# Patient Record
Sex: MALE | Race: WHITE
[De-identification: names, ages, dates, MRNs, and addresses within clinical notes are randomized per-mention and may not be internally consistent; named-entity substitution may affect disease eponyms.]

---

## 2018-07-20 ENCOUNTER — HOSPITAL ENCOUNTER (EMERGENCY)
Dept: HOSPITAL 76 - ED | Age: 73
Discharge: HOME | End: 2018-07-20
Payer: MEDICARE

## 2018-07-20 VITALS — SYSTOLIC BLOOD PRESSURE: 131 MMHG | DIASTOLIC BLOOD PRESSURE: 91 MMHG
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
## 2018-07-20 DIAGNOSIS — I48.91: ICD-10-CM
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
## 2018-07-20 DIAGNOSIS — Z79.01: ICD-10-CM

## 2018-07-20 DIAGNOSIS — Z79.82: ICD-10-CM

## 2018-07-20 DIAGNOSIS — S81.812A: Primary | ICD-10-CM

## 2018-07-20 PROCEDURE — 99283 EMERGENCY DEPT VISIT LOW MDM: CPT

## 2018-07-20 PROCEDURE — 12002 RPR S/N/AX/GEN/TRNK2.6-7.5CM: CPT

## 2018-07-20 PROCEDURE — 85610 PROTHROMBIN TIME: CPT

## 2018-07-20 PROCEDURE — 99282 EMERGENCY DEPT VISIT SF MDM: CPT

## 2018-07-20 NOTE — ED PHYSICIAN DOCUMENTATION
PD HPI LOWER EXT INJURY





- Stated complaint


Stated Complaint: LT LEG LAC





- Chief complaint


Chief Complaint: Laceration





- History obtained from


History obtained from: Patient





- History of Present Illness


PD HPI LOW EXT INJURY LOCATION: Left, Lower leg


Type of injury: Blunt / blow


Where injury occurred: Street


Timing - onset: Today


Timing - duration: Minutes


Timing - details: Abrupt onset, Still present


Improved by: Rest, Immobilization


Worsened by: Moving, Palpating


Associated symptoms: No: Weakness, Numbness, Tingling


Contributing factors: Anticoagulated


Similar symptoms before: Diagnosis (skin tear)


Recently seen: Not recently seen





- Additional information


Additional information: 





73-year-old male on Coumadin has hit the side of his left calf with his door 

and split open the skin.  He is come in now for suturing.  He is visiting here 

from [place] and he will be in the area until September.





Review of Systems


Constitutional: denies: Fever


Eyes: denies: Decreased vision


Ears: denies: Ear pain


Nose: denies: Congestion


Throat: denies: Sore throat


Respiratory: denies: Cough


GI: denies: Vomiting


Skin: reports: Laceration (s)


Neurologic: denies: Generalized weakness, Focal weakness, Numbness





PD PAST MEDICAL HISTORY





- Past Medical History


Past Medical History: Yes


Cardiovascular: Atrial fibrillation


Respiratory: Sleep apnea


: Other


Other Past Medical History: Prostate cancer.





- Past Surgical History


Past Surgical History: Yes


Cardiovascular: Other





- Present Medications


Home Medications: 


 Ambulatory Orders











 Medication  Instructions  Recorded  Confirmed


 


Albuterol 2.5 mg INH Q4H PRN 07/20/18 07/20/18


 


Aspirin 81 mg PO 07/20/18 


 


Atorvastatin [Lipitor] 10 mg ORAL DAILY 07/20/18 07/20/18


 


Benazepril HCl 40 mg PO 07/20/18 


 


Bifidobacterium Infantis [Evivo] 0.04 gm PO 07/20/18 


 


Calcium Carbonate/Vitamin D3 1 each PO 07/20/18 





[Calcium 500-Vit D3 600 Caplet]   


 


Clobetasol 0.05% Oint [Temovate 1 applic TOP 07/20/18 





0.05% Oint]   


 


Cyanocobalamin [Vitamin B-12] 1,000 mcg ORAL ONCE 07/20/18 07/20/18


 


Diphenoxylate/Atropine [Lomotil]  07/20/18 


 


Doxepin [SINEquan] 25 mg PO QPM 07/20/18 07/20/18


 


Folic Acid 1 mg PO DAILY 07/20/18 07/20/18


 


Guaifenesin [Mucinex] 600 mg PO 07/20/18 


 


Imiquimod 1 each TP 07/20/18 


 


Leuprolide [Lupron] 7.5 mg IM 07/20/18 


 


Methotrexate 2.5 mg PO DAILY 07/20/18 07/20/18


 


Methyl Cpg 1 tab ORAL DAILY 07/20/18 


 


Nad, Reduced, Disodium 500 mg ORAL DAILY 07/20/18 07/20/18





[Nicotinamide Adenine]   


 


Sotalol [Betapace] 120 mg PO BID 07/20/18 07/20/18


 


Tiotropium Bromide [Spiriva 2 puffs IH 07/20/18 





Respimat]   


 


Warfarin [Coumadin] 2.5 mg PO 1400 07/20/18 07/20/18


 


amLODIPine [Norvasc] 5 mg PO ONCE 07/20/18 07/20/18














- Allergies


Allergies/Adverse Reactions: 


 Allergies











Allergy/AdvReac Type Severity Reaction Status Date / Time


 


Sulfa (Sulfonamide Allergy  Emesis Verified 07/20/18 11:09





Antibiotics)     














- Social History


Does the pt smoke?: No


Smoking Status: Never smoker


Does the pt drink ETOH?: Yes


ETOH Use: Wine


Does the pt have substance abuse?: No





- Immunizations


Immunizations are current?: Yes


Immunizations: TDAP current <10years





PD ED PE NORMAL





- Vitals


Vital signs reviewed: Yes (hypertensive )





- General


General: Alert and oriented X 3, No acute distress, Well developed/nourished





- HEENT


HEENT: Atraumatic, PERRL, EOMI





- Respiratory


Respiratory: No respiratory distress





- Derm


Derm: Normal color, Warm and dry, No rash





- Extremities


Extremities: No deformity, No edema, Other (over the medial aspect of the left 

calf about mid calf is a 6 cm laceration that penetrates to the fascia and is 

through the fatty tissue. There is a thin flap of skin associtated with this 

that is wider more anteriorly and split anteriorly )





- Neuro


Neuro: Alert and oriented X 3, No motor deficit, No sensory deficit, Normal 

speech


Eye Opening: Spontaneous


Motor: Obeys Commands


Verbal: Oriented


GCS Score: 15





- Psych


Psych: Normal mood, Normal affect





Results





- Vitals


Vitals: 


 Vital Signs - 24 hr











  07/20/18





  11:06


 


Temperature 36.4 C L


 


Heart Rate 65


 


Respiratory 16





Rate 


 


Blood Pressure 131/109 H


 


O2 Saturation 99








 Oxygen











O2 Source                      Room air

















- Labs


Labs: 


 Laboratory Tests











  07/20/18





  12:23


 


Whole Blood INR  2.6 H














Procedures





- Laceration (location)


  ** left calf


Length in cm: 6


Wound type: Irregular, Into subcut fat, Clean


Neurovascular status: Sensory intact, Motor intact, Vascular intact


Anesthesia: Marcaine 0.5%


Wound Preparation: Hibiclens, Irrigated copiously NS, Wound explored, To the 

base


Skin layer closure: Nylon, Interrupted, Size #-0 - enter number (4-0)


Other: Patient tolerated well, No complications, Neurovascular intact, Dressing 

applied, Tetanus UTD


Complexity: Simple





PD MEDICAL DECISION MAKING





- ED course


Complexity details: considered differential, d/w patient


ED course: 


73-year-old male with a laceration and a thin flap of skin that is inadequate 

to entirely cover the laceration is sutured most of the laceration is 

reapproximated nicely there is some that will require secondary intention for 

healing.  Wound dressing is placed he is up-to-date on his tetanus.








- Sepsis Event


Vital Signs: 


 Vital Signs - 24 hr











  07/20/18





  11:06


 


Temperature 36.4 C L


 


Heart Rate 65


 


Respiratory 16





Rate 


 


Blood Pressure 131/109 H


 


O2 Saturation 99








 Oxygen











O2 Source                      Room air

















Departure





- Departure


Disposition: 01 Home, Self Care


Clinical Impression: 


Laceration of calf


Qualifiers:


 Encounter type: initial encounter Laterality: left Qualified Code(s): S81.812A 

- Laceration without foreign body, left lower leg, initial encounter





Condition: Stable


Instructions:  ED Laceration All


Follow-Up: 


[facility] [Provider Group]


Comments: 


These sutures will need to be removed in about 2 weeks.  This area of the body 

heals particularly slow and you may still have an issue with this wound for 

about 1 month.

## 2020-08-27 ENCOUNTER — HOSPITAL ENCOUNTER (EMERGENCY)
Dept: HOSPITAL 76 - ED | Age: 75
Discharge: HOME | End: 2020-08-27
Payer: MEDICARE

## 2020-08-27 VITALS — SYSTOLIC BLOOD PRESSURE: 129 MMHG | DIASTOLIC BLOOD PRESSURE: 89 MMHG

## 2020-08-27 DIAGNOSIS — I10: ICD-10-CM

## 2020-08-27 DIAGNOSIS — Z79.01: ICD-10-CM

## 2020-08-27 DIAGNOSIS — Z79.82: ICD-10-CM

## 2020-08-27 DIAGNOSIS — I48.91: ICD-10-CM
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
## 2020-08-27 DIAGNOSIS — S01.21XA: Primary | ICD-10-CM

## 2020-08-27 PROCEDURE — 85610 PROTHROMBIN TIME: CPT

## 2020-08-27 PROCEDURE — 99283 EMERGENCY DEPT VISIT LOW MDM: CPT

## 2020-08-27 PROCEDURE — 30901 CONTROL OF NOSEBLEED: CPT

## 2020-08-27 NOTE — ED PHYSICIAN DOCUMENTATION
PD HPI HEENT





- Stated complaint


Stated Complaint: NOSE BLEED





- Chief complaint


Chief Complaint: Trauma Hd/Nk





- History obtained from


History obtained from: Patient, Family





- History of Present Illness


Timing - onset: Last night


Timing - duration: Hours


Timing - details: Abrupt onset, Now resolved


Location: Nose


Improves: Other (pressure)


Associated symptoms: Cough (similar to always), Other (wears C-pap).  No: Fever,

Congestion, Rhinorrhea, Trismus, Unable to swallow, Swollen nodes, Facial 

swelling, Headache


Similar symptoms before: Has not had sx before


Recently seen: Not recently seen





Review of Systems


Constitutional: denies: Fever


Eyes: denies: Decreased vision


Ears: denies: Ear pain


Nose: reports: Epistaxis


Throat: denies: Sore throat


Cardiac: denies: Chest pain / pressure


Respiratory: reports: Dyspnea (similar to always), Cough


GI: denies: Nausea, Vomiting


: denies: Dysuria, Frequency





PD PAST MEDICAL HISTORY





- Past Medical History


Past Medical History: Yes


Cardiovascular: Hypertension, Coronary artery disease, Atrial fibrillation


Respiratory: Sleep apnea


: Other


Other Past Medical History: Chronic back pain, Hyoperlipidemia,anemia,.  skin 

cx, syncope, prostate cax,





- Past Surgical History


Past Surgical History: Yes


Cardiovascular: Other





- Present Medications


Home Medications: 


                                Ambulatory Orders











 Medication  Instructions  Recorded  Confirmed


 


Albuterol 2.5 mg INH Q4H PRN 07/20/18 07/20/18


 


Aspirin 81 mg PO 07/20/18 


 


Atorvastatin [Lipitor] 10 mg ORAL DAILY 07/20/18 07/20/18


 


Benazepril HCl 40 mg PO 07/20/18 


 


Bifidobacterium Infantis [Evivo] 0.04 gm PO 07/20/18 


 


Calcium Carbonate/Vitamin D3 1 each PO 07/20/18 





[Calcium 500-Vit D3 600 Caplet]   


 


Clobetasol 0.05% Oint [Temovate 1 applic TOP 07/20/18 





0.05% Oint]   


 


Cyanocobalamin [Vitamin B-12] 1,000 mcg ORAL ONCE 07/20/18 07/20/18


 


Diphenoxylate/Atropine [Lomotil]  07/20/18 


 


Doxepin [SINEquan] 25 mg PO QPM 07/20/18 07/20/18


 


Folic Acid 1 mg PO DAILY 07/20/18 07/20/18


 


Guaifenesin [Mucinex] 600 mg PO 07/20/18 


 


Imiquimod 1 each TP 07/20/18 


 


Leuprolide [Lupron] 7.5 mg IM 07/20/18 


 


Methotrexate 2.5 mg PO DAILY 07/20/18 07/20/18


 


Methyl Cpg 1 tab ORAL DAILY 07/20/18 


 


Nad, Reduced, Disodium 500 mg ORAL DAILY 07/20/18 07/20/18





[Nicotinamide Adenine]   


 


Sotalol [Betapace] 120 mg PO BID 07/20/18 07/20/18


 


Tiotropium Bromide [Spiriva 2 puffs IH 07/20/18 





Respimat]   


 


Warfarin [Coumadin] 2.5 mg PO 1400 07/20/18 07/20/18


 


amLODIPine [Norvasc] 5 mg PO ONCE 07/20/18 07/20/18














- Allergies


Allergies/Adverse Reactions: 


                                    Allergies











Allergy/AdvReac Type Severity Reaction Status Date / Time


 


Sulfa (Sulfonamide Allergy  Emesis Verified 08/27/20 10:42





Antibiotics)     














- Social History


Does the pt smoke?: No


Smoking Status: Never smoker


Does the pt drink ETOH?: Yes


Does the pt have substance abuse?: No





- Immunizations


Immunizations are current?: Yes


Immunizations: TDAP current <10years





PD ED PE NORMAL





- Vitals


Vital signs reviewed: Yes (normal )





- General


General: Alert and oriented X 3, No acute distress





- HEENT


HEENT: Atraumatic, PERRL, EOMI, Other (There is a spot on the septum just inside

 the nose where it appears acute bleeding has recently occurred.  The remainder 

of the blood is removed away from this and there does appear to be a small 

laceration to the septum.  This is cauterized with silver nitrate.)





- Respiratory


Respiratory: No respiratory distress





Results





- Vitals


Vitals: 





                               Vital Signs - 24 hr











  08/27/20





  10:39


 


Temperature 36.3 C L


 


Heart Rate 61


 


Respiratory 16





Rate 


 


Blood Pressure 108/71


 


O2 Saturation 97








                                     Oxygen











O2 Source                      Room air

















- Labs


Labs: 





                                Laboratory Tests











  08/27/20





  11:24


 


Whole Blood INR  1.9 H














PD MEDICAL DECISION MAKING





- ED course


Complexity details: considered differential, d/w patient


ED course: 





75-year-old male with acute epistaxis in the anterior septum has a small cut in 

the area and this is cauterized with silver nitrate after administration of 

topical let.  Patient tolerates this well and the bleeding appears controlled.  

His spot of bleeding is amenable to clamping and he has been given a clamp and 

instructions on its use.





Departure





- Departure


Disposition: 01 Home, Self Care


Clinical Impression: 


 Epistaxis





Condition: Stable


Instructions:  ED Nosebleed


Follow-Up: 


EMILY TOWNSEND MD [Primary Care Provider] -